# Patient Record
Sex: FEMALE | Race: WHITE
[De-identification: names, ages, dates, MRNs, and addresses within clinical notes are randomized per-mention and may not be internally consistent; named-entity substitution may affect disease eponyms.]

---

## 2019-10-29 ENCOUNTER — HOSPITAL ENCOUNTER (OUTPATIENT)
Dept: HOSPITAL 44 - OPSURG | Age: 69
Discharge: HOME | End: 2019-10-29
Attending: PAIN MEDICINE
Payer: COMMERCIAL

## 2019-10-29 DIAGNOSIS — M51.16: Primary | ICD-10-CM

## 2019-10-29 DIAGNOSIS — M47.26: ICD-10-CM

## 2019-10-29 DIAGNOSIS — M54.5: ICD-10-CM

## 2019-10-29 PROCEDURE — 63650 IMPLANT NEUROELECTRODES: CPT

## 2019-11-01 NOTE — OPERATIVE NOTE
PROCEDURE DATE:  10/29/2019



PREOPERATIVE DIAGNOSIS:  

1. Lumbosacral radiculopathy. 

2. Lumbar degenerative disc disease, lumbar spondylosis.



POSTOPERATIVE DIAGNOSIS:  

1. Lumbosacral radiculopathy. 

2. Lumbar degenerative disc disease, lumbar spondylosis.



PROCEDURE(S) PERFORMED:     

1. Percutaneous insertion of spinal cord stimulator lead x2 under fluoroscopic 
guidance; CPT Code #63685 x2.

2. Complex analysis and programming of spinal cord stimulator leads; CPT Code 
#42921.



SURGEON:  Stephen Izaguirre M.D.



ANESTHESIA:  Local.



COMPLICATIONS:  None. 



INDICATION FOR PROCEDURE:  The patient is a very pleasant 69-year-old with 
extensive lumbar degenerative disc disease and spinal stenosis. She presents 
today having failed conservative therapy for low back and lower extremity pain, 
for spinal cord stimulation. Preoperative laboratory data were checked with CBC 
and partial thromboplastin time. The patients laboratory data was normal with 
no evidence of any coagulopathy. 

  

DESCRIPTION OF PROCEDURE:  The patient was taken to the operative suite at 
[facility] and placed in the prone position. 
Intravenous sedation was given with monitored anesthesia care. Sterile prep and 
drape of the low back was performed using Betadine prep. Under fluoroscopic 
guidance the L2-3 interspace was identified. Using a paramedian approach after 
proper anesthetization of the skin a 14 gauge epidural needle was placed in the 
epidural space. Under live lateral fluoroscopic guidance the epidural space was 
entered using the loss of resistance technique with saline. There was no 
evidence of any blood, CSF, paresthesias following which time an 8-contact 
spinal cord stimulator lead was placed in the posterior portion of the epidural 
space and threaded upward until it covered the T8-T9 interspace in the posterior
portion of the epidural space. 



A second lead was placed to the L2-3 interspace with a 14 gauge epidural needle 
in juxtaposition to the first using the same technique. A second 8-contact 
spinal cord stimulator electrode was placed in the posterior portion of the 
epidural space and tunneled upward until the lead covered the T8-T9 interspace 
in juxtaposition to the first under live lateral projection in the posterior 
portion of the epidural space. 



Complex programming was then done intraoperatively and the patient was alert and
under sedation was able to confirm stimulation in the area of the patients pain
complaint, which was in the low back and lower extremities following which time 
the epidural needles were removed from the epidural space. The stylets were 
removed from the leads, and the leads were secured to the back using a StayFIX 
and string loop. The patient was then taken to the recovery room alert and 
oriented, moving all extremities. Further programming was done with the 
assistance of the representative from [facility]. The company representative 
assisted with programming in the recovery area. The patient was alert and 
oriented, moving all extremities and was discharged to follow up in 
approximately 1 week. She was given a prescription for Levaquin 750 mg to take 1
tablet p.o. daily and tramadol/acetaminophen 37.5 mg 1 tab p.o. daily. 









X_________________________                        

STEPHEN IZAGUIRRE M.D.            DATE SIGNED   TIME SIGNED   

(Please copy [facility] provider when applicable)

Chris

D:  10/29/19

T:   11/01/19

JOB#: [id number]

MTDD